# Patient Record
Sex: FEMALE | Race: WHITE | NOT HISPANIC OR LATINO | URBAN - METROPOLITAN AREA
[De-identification: names, ages, dates, MRNs, and addresses within clinical notes are randomized per-mention and may not be internally consistent; named-entity substitution may affect disease eponyms.]

---

## 2017-04-17 ENCOUNTER — APPOINTMENT (RX ONLY)
Dept: URBAN - METROPOLITAN AREA CLINIC 167 | Facility: CLINIC | Age: 72
Setting detail: DERMATOLOGY
End: 2017-04-17

## 2017-04-17 DIAGNOSIS — D22 MELANOCYTIC NEVI: ICD-10-CM

## 2017-04-17 DIAGNOSIS — Z71.89 OTHER SPECIFIED COUNSELING: ICD-10-CM

## 2017-04-17 DIAGNOSIS — L82.1 OTHER SEBORRHEIC KERATOSIS: ICD-10-CM

## 2017-04-17 DIAGNOSIS — Z85.828 PERSONAL HISTORY OF OTHER MALIGNANT NEOPLASM OF SKIN: ICD-10-CM

## 2017-04-17 PROBLEM — D22.5 MELANOCYTIC NEVI OF TRUNK: Status: ACTIVE | Noted: 2017-04-17

## 2017-04-17 PROCEDURE — ? COUNSELING

## 2017-04-17 PROCEDURE — 99213 OFFICE O/P EST LOW 20 MIN: CPT

## 2017-04-17 ASSESSMENT — LOCATION SIMPLE DESCRIPTION DERM
LOCATION SIMPLE: RIGHT CALF
LOCATION SIMPLE: LEFT EAR
LOCATION SIMPLE: LEFT ANTERIOR NECK
LOCATION SIMPLE: UPPER BACK

## 2017-04-17 ASSESSMENT — LOCATION DETAILED DESCRIPTION DERM
LOCATION DETAILED: LEFT SUPERIOR LATERAL NECK
LOCATION DETAILED: LEFT ANTERIOR EARLOBE
LOCATION DETAILED: RIGHT DISTAL CALF
LOCATION DETAILED: SUPERIOR THORACIC SPINE

## 2017-04-17 ASSESSMENT — LOCATION ZONE DERM
LOCATION ZONE: EAR
LOCATION ZONE: NECK
LOCATION ZONE: LEG
LOCATION ZONE: TRUNK

## 2017-10-16 ENCOUNTER — APPOINTMENT (RX ONLY)
Dept: URBAN - METROPOLITAN AREA CLINIC 167 | Facility: CLINIC | Age: 72
Setting detail: DERMATOLOGY
End: 2017-10-16

## 2017-10-16 DIAGNOSIS — Z71.89 OTHER SPECIFIED COUNSELING: ICD-10-CM

## 2017-10-16 DIAGNOSIS — D22 MELANOCYTIC NEVI: ICD-10-CM

## 2017-10-16 DIAGNOSIS — L82.1 OTHER SEBORRHEIC KERATOSIS: ICD-10-CM

## 2017-10-16 DIAGNOSIS — Z85.828 PERSONAL HISTORY OF OTHER MALIGNANT NEOPLASM OF SKIN: ICD-10-CM

## 2017-10-16 DIAGNOSIS — L57.0 ACTINIC KERATOSIS: ICD-10-CM

## 2017-10-16 PROBLEM — D22.5 MELANOCYTIC NEVI OF TRUNK: Status: ACTIVE | Noted: 2017-10-16

## 2017-10-16 PROBLEM — C44.41 BASAL CELL CARCINOMA OF SKIN OF SCALP AND NECK: Status: ACTIVE | Noted: 2017-10-16

## 2017-10-16 PROCEDURE — ? COUNSELING

## 2017-10-16 PROCEDURE — 17000 DESTRUCT PREMALG LESION: CPT

## 2017-10-16 PROCEDURE — ? LIQUID NITROGEN

## 2017-10-16 PROCEDURE — 11100: CPT | Mod: 59

## 2017-10-16 PROCEDURE — ? BIOPSY BY SHAVE METHOD AND DESTRUCTION

## 2017-10-16 PROCEDURE — 99213 OFFICE O/P EST LOW 20 MIN: CPT | Mod: 25

## 2017-10-16 PROCEDURE — 17271 DSTR MAL LES S/N/H/F/G 0.6-1: CPT | Mod: 59

## 2017-10-16 ASSESSMENT — LOCATION ZONE DERM
LOCATION ZONE: EAR
LOCATION ZONE: TRUNK
LOCATION ZONE: NECK
LOCATION ZONE: LEG
LOCATION ZONE: ARM

## 2017-10-16 ASSESSMENT — LOCATION DETAILED DESCRIPTION DERM
LOCATION DETAILED: RIGHT LATERAL DORSAL WRIST
LOCATION DETAILED: RIGHT DISTAL CALF
LOCATION DETAILED: LEFT ANTERIOR EARLOBE
LOCATION DETAILED: RIGHT DISTAL PRETIBIAL REGION
LOCATION DETAILED: LEFT SUPERIOR LATERAL NECK
LOCATION DETAILED: LEFT SUPERIOR UPPER BACK

## 2017-10-16 ASSESSMENT — LOCATION SIMPLE DESCRIPTION DERM
LOCATION SIMPLE: LEFT EAR
LOCATION SIMPLE: RIGHT CALF
LOCATION SIMPLE: LEFT ANTERIOR NECK
LOCATION SIMPLE: RIGHT PRETIBIAL REGION
LOCATION SIMPLE: LEFT UPPER BACK
LOCATION SIMPLE: RIGHT WRIST

## 2017-10-16 NOTE — PROCEDURE: BIOPSY BY SHAVE METHOD AND DESTRUCTION
Lab Facility: [id number]
Anesthesia Type: 1% lidocaine without epinephrine
Biopsy Type: H and E
Destruction Type: electrodesiccation
Detail Level: Detailed
Anesthesia Volume In Cc: 0.5
Billing Type: Third-Party Bill
Wound Care: Aquaphor
Render Post-Care Instructions In Note?: no
Size Of Lesion After Curettage: 0.6
Hemostasis: Aluminum Chloride
Size Of Lesion In Cm (Optional): 0.4
Bill As?: Biopsy by Shave Method
Notification Instructions: Patient will be notified of biopsy results. However, patient instructed to call the office if not contacted within 2 weeks.
Post-Care Instructions: I reviewed with the patient in detail post-care instructions. Patient is to keep the biopsy site dry overnight, and then apply bacitracin twice daily until healed. Patient may apply hydrogen peroxide soaks to remove any crusting.
Number Of Curettages: 2
Consent: Written consent was obtained and risks were reviewed including but not limited to scarring, infection, bleeding, scabbing, incomplete removal, nerve damage and allergy to anesthesia.
Lab: [id number]
Billing Type: Third-Party Bill
Size Of Lesion After Curettage: 0.8
Lab Facility: [id number]
Lab: [id number]
Bill As?: Malignant Destruction

## 2017-10-16 NOTE — PROCEDURE: LIQUID NITROGEN
Duration Of Freeze Thaw-Cycle (Seconds): 10
Number Of Freeze-Thaw Cycles: 2 freeze-thaw cycles
Post-Care Instructions: I reviewed with the patient in detail post-care instructions. Patient is to wear sunprotection, and avoid picking at any of the treated lesions. Pt may apply Vaseline to crusted or scabbing areas.
Detail Level: Detailed
Consent: The patient's consent was obtained including but not limited to risks of crusting, scabbing, blistering, scarring, darker or lighter pigmentary change, recurrence, incomplete removal and infection.
Render Post-Care Instructions In Note?: no

## 2018-04-16 ENCOUNTER — APPOINTMENT (RX ONLY)
Dept: URBAN - METROPOLITAN AREA CLINIC 167 | Facility: CLINIC | Age: 73
Setting detail: DERMATOLOGY
End: 2018-04-16

## 2018-04-16 DIAGNOSIS — Z71.89 OTHER SPECIFIED COUNSELING: ICD-10-CM

## 2018-04-16 DIAGNOSIS — Z85.828 PERSONAL HISTORY OF OTHER MALIGNANT NEOPLASM OF SKIN: ICD-10-CM

## 2018-04-16 DIAGNOSIS — D22 MELANOCYTIC NEVI: ICD-10-CM

## 2018-04-16 DIAGNOSIS — L82.1 OTHER SEBORRHEIC KERATOSIS: ICD-10-CM

## 2018-04-16 PROBLEM — D22.5 MELANOCYTIC NEVI OF TRUNK: Status: ACTIVE | Noted: 2018-04-16

## 2018-04-16 PROBLEM — C44.41 BASAL CELL CARCINOMA OF SKIN OF SCALP AND NECK: Status: ACTIVE | Noted: 2018-04-16

## 2018-04-16 PROCEDURE — ? COUNSELING

## 2018-04-16 PROCEDURE — 99213 OFFICE O/P EST LOW 20 MIN: CPT | Mod: 25

## 2018-04-16 PROCEDURE — 17271 DSTR MAL LES S/N/H/F/G 0.6-1: CPT

## 2018-04-16 PROCEDURE — ? CURETTAGE AND DESTRUCTION

## 2018-04-16 ASSESSMENT — LOCATION ZONE DERM
LOCATION ZONE: TRUNK
LOCATION ZONE: EAR
LOCATION ZONE: LEG

## 2018-04-16 ASSESSMENT — LOCATION DETAILED DESCRIPTION DERM
LOCATION DETAILED: LEFT ANTERIOR EARLOBE
LOCATION DETAILED: INFERIOR THORACIC SPINE
LOCATION DETAILED: RIGHT DISTAL CALF

## 2018-04-16 ASSESSMENT — LOCATION SIMPLE DESCRIPTION DERM
LOCATION SIMPLE: UPPER BACK
LOCATION SIMPLE: LEFT EAR
LOCATION SIMPLE: RIGHT CALF

## 2018-04-16 NOTE — PROCEDURE: CURETTAGE AND DESTRUCTION
Size Of Lesion After Curettage: 1
Anesthesia Type: 1% lidocaine with epinephrine
Number Of Curettages: 3
Post-Care Instructions: I reviewed with the patient in detail post-care instructions. Patient is to keep the area dry for 48 hours, and not to engage in any swimming until the area is healed. Should the patient develop any fevers, chills, bleeding, severe pain patient will contact the office immediately.
Size Of Lesion In Cm: 0.9
Add Intralesional Injection: No
Detail Level: Detailed
Consent was obtained from the patient. The risks, benefits and alternatives to therapy were discussed in detail. Specifically, the risks of infection, scarring, bleeding, prolonged wound healing, nerve injury, incomplete removal, allergy to anesthesia and recurrence were addressed. Alternatives to ED&C, such as: surgical removal and XRT were also discussed.  Prior to the procedure, the treatment site was clearly identified and confirmed by the patient. All components of Universal Protocol/PAUSE Rule completed.
Cautery Type: electrodesiccation
Additional Information: (Optional): The wound was cleaned, and a pressure dressing was applied.  The patient received detailed post-op instructions.
Anesthesia Volume In Cc: 0.3
Bill As A Line Item Or As Units: Line Item
What Was Performed First?: Curettage

## 2018-10-15 ENCOUNTER — APPOINTMENT (RX ONLY)
Dept: URBAN - METROPOLITAN AREA CLINIC 167 | Facility: CLINIC | Age: 73
Setting detail: DERMATOLOGY
End: 2018-10-15

## 2018-10-15 DIAGNOSIS — D22 MELANOCYTIC NEVI: ICD-10-CM

## 2018-10-15 DIAGNOSIS — Z85.828 PERSONAL HISTORY OF OTHER MALIGNANT NEOPLASM OF SKIN: ICD-10-CM

## 2018-10-15 DIAGNOSIS — L57.0 ACTINIC KERATOSIS: ICD-10-CM

## 2018-10-15 DIAGNOSIS — Z71.89 OTHER SPECIFIED COUNSELING: ICD-10-CM

## 2018-10-15 DIAGNOSIS — L82.1 OTHER SEBORRHEIC KERATOSIS: ICD-10-CM

## 2018-10-15 PROBLEM — D22.5 MELANOCYTIC NEVI OF TRUNK: Status: ACTIVE | Noted: 2018-10-15

## 2018-10-15 PROBLEM — C44.319 BASAL CELL CARCINOMA OF SKIN OF OTHER PARTS OF FACE: Status: ACTIVE | Noted: 2018-10-15

## 2018-10-15 PROCEDURE — ? BIOPSY BY SHAVE METHOD AND DESTRUCTION

## 2018-10-15 PROCEDURE — 99213 OFFICE O/P EST LOW 20 MIN: CPT | Mod: 25

## 2018-10-15 PROCEDURE — ? COUNSELING

## 2018-10-15 PROCEDURE — 17000 DESTRUCT PREMALG LESION: CPT

## 2018-10-15 PROCEDURE — ? LIQUID NITROGEN

## 2018-10-15 PROCEDURE — 17281 DSTR MAL LS F/E/E/N/L/M .6-1: CPT | Mod: 59

## 2018-10-15 ASSESSMENT — LOCATION ZONE DERM
LOCATION ZONE: TRUNK
LOCATION ZONE: EAR
LOCATION ZONE: LEG
LOCATION ZONE: FACE

## 2018-10-15 ASSESSMENT — LOCATION DETAILED DESCRIPTION DERM
LOCATION DETAILED: LEFT ANTERIOR EARLOBE
LOCATION DETAILED: LEFT SUPERIOR CENTRAL BUCCAL CHEEK
LOCATION DETAILED: INFERIOR THORACIC SPINE
LOCATION DETAILED: RIGHT DISTAL CALF

## 2018-10-15 ASSESSMENT — LOCATION SIMPLE DESCRIPTION DERM
LOCATION SIMPLE: LEFT EAR
LOCATION SIMPLE: RIGHT CALF
LOCATION SIMPLE: UPPER BACK
LOCATION SIMPLE: LEFT CHEEK

## 2018-10-15 NOTE — PROCEDURE: LIQUID NITROGEN
Render Post-Care Instructions In Note?: no
Consent: The patient's consent was obtained including but not limited to risks of crusting, scabbing, blistering, scarring, darker or lighter pigmentary change, recurrence, incomplete removal and infection.
Number Of Freeze-Thaw Cycles: 2 freeze-thaw cycles
Detail Level: Detailed
Post-Care Instructions: I reviewed with the patient in detail post-care instructions. Patient is to wear sunprotection, and avoid picking at any of the treated lesions. Pt may apply Vaseline to crusted or scabbing areas.
Duration Of Freeze Thaw-Cycle (Seconds): 10

## 2018-10-15 NOTE — PROCEDURE: BIOPSY BY SHAVE METHOD AND DESTRUCTION
Anesthesia Type: 1% lidocaine with epinephrine 1:100,000 buffered with 8.4% sodium bicarbonate (1:9 ratio)
Bill As?: Malignant Destruction
Detail Level: Detailed
Biopsy Type: H and E
Anesthesia Volume In Cc: 0.5
Lab Facility: [id number]
Consent: Written consent was obtained and risks were reviewed including but not limited to scarring, infection, bleeding, scabbing, incomplete removal, nerve damage and allergy to anesthesia.
Billing Type: Third-Party Bill
Destruction Type: electrodesiccation
Bill 10409 For Specimen Handling/Conveyance To Laboratory?: no
Wound Care: Aquaphor
Size Of Lesion In Cm (Optional): 0.6
Post-Care Instructions: I reviewed with the patient in detail post-care instructions. Patient is to keep the biopsy site dry overnight, and then apply bacitracin twice daily until healed. Patient may apply hydrogen peroxide soaks to remove any crusting.
Notification Instructions: Patient will be notified of biopsy results. However, patient instructed to call the office if not contacted within 2 weeks.
Size Of Lesion After Curettage: 0.8
Number Of Curettages: 2
Lab: [id number]
Hemostasis: Aluminum Chloride

## 2019-04-15 ENCOUNTER — APPOINTMENT (RX ONLY)
Dept: URBAN - METROPOLITAN AREA CLINIC 167 | Facility: CLINIC | Age: 74
Setting detail: DERMATOLOGY
End: 2019-04-15

## 2019-04-15 DIAGNOSIS — L57.0 ACTINIC KERATOSIS: ICD-10-CM

## 2019-04-15 DIAGNOSIS — Z71.89 OTHER SPECIFIED COUNSELING: ICD-10-CM

## 2019-04-15 DIAGNOSIS — L82.1 OTHER SEBORRHEIC KERATOSIS: ICD-10-CM

## 2019-04-15 DIAGNOSIS — Z85.828 PERSONAL HISTORY OF OTHER MALIGNANT NEOPLASM OF SKIN: ICD-10-CM

## 2019-04-15 DIAGNOSIS — D22 MELANOCYTIC NEVI: ICD-10-CM

## 2019-04-15 PROBLEM — D48.5 NEOPLASM OF UNCERTAIN BEHAVIOR OF SKIN: Status: ACTIVE | Noted: 2019-04-15

## 2019-04-15 PROBLEM — C44.719 BASAL CELL CARCINOMA OF SKIN OF LEFT LOWER LIMB, INCLUDING HIP: Status: ACTIVE | Noted: 2019-04-15

## 2019-04-15 PROBLEM — D22.5 MELANOCYTIC NEVI OF TRUNK: Status: ACTIVE | Noted: 2019-04-15

## 2019-04-15 PROCEDURE — 17003 DESTRUCT PREMALG LES 2-14: CPT

## 2019-04-15 PROCEDURE — 17000 DESTRUCT PREMALG LESION: CPT | Mod: 59

## 2019-04-15 PROCEDURE — 11102 TANGNTL BX SKIN SINGLE LES: CPT

## 2019-04-15 PROCEDURE — ? LIQUID NITROGEN

## 2019-04-15 PROCEDURE — ? BIOPSY BY SHAVE METHOD AND DESTRUCTION

## 2019-04-15 PROCEDURE — 99213 OFFICE O/P EST LOW 20 MIN: CPT | Mod: 25

## 2019-04-15 PROCEDURE — ? COUNSELING

## 2019-04-15 PROCEDURE — 17260 DSTRJ MAL LES T/A/L 0.5 CM/<: CPT | Mod: 59

## 2019-04-15 ASSESSMENT — LOCATION SIMPLE DESCRIPTION DERM
LOCATION SIMPLE: LEFT CHEEK
LOCATION SIMPLE: NOSE
LOCATION SIMPLE: LEFT EAR
LOCATION SIMPLE: UPPER BACK
LOCATION SIMPLE: RIGHT CALF
LOCATION SIMPLE: RIGHT PRETIBIAL REGION

## 2019-04-15 ASSESSMENT — LOCATION DETAILED DESCRIPTION DERM
LOCATION DETAILED: RIGHT DISTAL CALF
LOCATION DETAILED: LEFT SUPERIOR LATERAL BUCCAL CHEEK
LOCATION DETAILED: LEFT ANTERIOR EARLOBE
LOCATION DETAILED: INFERIOR THORACIC SPINE
LOCATION DETAILED: LEFT NASAL DORSUM
LOCATION DETAILED: RIGHT DISTAL PRETIBIAL REGION
LOCATION DETAILED: RIGHT NASAL DORSUM

## 2019-04-15 ASSESSMENT — LOCATION ZONE DERM
LOCATION ZONE: FACE
LOCATION ZONE: LEG
LOCATION ZONE: NOSE
LOCATION ZONE: EAR
LOCATION ZONE: TRUNK

## 2019-04-15 NOTE — HPI: NON-MELANOMA SKIN CANCER F/U (HISTORY OF NMSC)
How Many Skin Cancers Have You Had?: more than one
What Is The Reason For Today's Visit?: Follow Up Non-Melanoma Skin Cancer
When Was Your Last Cancer Diagnosed?: 10/18

## 2019-04-15 NOTE — PROCEDURE: BIOPSY BY SHAVE METHOD AND DESTRUCTION
Render Post-Care Instructions In Note?: no
Lab Facility: [id number]
Size Of Lesion After Curettage: 0.8
Number Of Curettages: 2
Notification Instructions: Patient will be notified of biopsy results. However, patient instructed to call the office if not contacted within 2 weeks.
Anesthesia Volume In Cc: 0.5
Anesthesia Type: 1% lidocaine with epinephrine 1:100,000 buffered with 8.4% sodium bicarbonate (1:9 ratio)
Lab: [id number]
Detail Level: Detailed
Bill As?: Biopsy by Shave Method
Biopsy Type: H and E
Billing Type: Third-Party Bill
Destruction Type: electrodesiccation
Hemostasis: Aluminum Chloride
Consent: Written consent was obtained and risks were reviewed including but not limited to scarring, infection, bleeding, scabbing, incomplete removal, nerve damage and allergy to anesthesia.
Size Of Lesion In Cm (Optional): 0.6
Wound Care: Aquaphor
Post-Care Instructions: I reviewed with the patient in detail post-care instructions. Patient is to keep the biopsy site dry overnight, and then apply bacitracin twice daily until healed. Patient may apply hydrogen peroxide soaks to remove any crusting.
Billing Type: Third-Party Bill
Lab: [id number]
Size Of Lesion In Cm (Optional): 0.3
Lab Facility: [id number]
Bill As?: Malignant Destruction

## 2019-10-23 ENCOUNTER — APPOINTMENT (RX ONLY)
Dept: URBAN - METROPOLITAN AREA CLINIC 167 | Facility: CLINIC | Age: 74
Setting detail: DERMATOLOGY
End: 2019-10-23

## 2019-10-23 DIAGNOSIS — Z85.828 PERSONAL HISTORY OF OTHER MALIGNANT NEOPLASM OF SKIN: ICD-10-CM

## 2019-10-23 DIAGNOSIS — D22 MELANOCYTIC NEVI: ICD-10-CM

## 2019-10-23 DIAGNOSIS — Z71.89 OTHER SPECIFIED COUNSELING: ICD-10-CM

## 2019-10-23 DIAGNOSIS — L82.1 OTHER SEBORRHEIC KERATOSIS: ICD-10-CM

## 2019-10-23 PROBLEM — D22.5 MELANOCYTIC NEVI OF TRUNK: Status: ACTIVE | Noted: 2019-10-23

## 2019-10-23 PROCEDURE — 99213 OFFICE O/P EST LOW 20 MIN: CPT

## 2019-10-23 PROCEDURE — ? COUNSELING

## 2019-10-23 ASSESSMENT — LOCATION DETAILED DESCRIPTION DERM
LOCATION DETAILED: RIGHT MEDIAL UPPER BACK
LOCATION DETAILED: LEFT SUPERIOR LATERAL BUCCAL CHEEK
LOCATION DETAILED: LEFT ANTERIOR EARLOBE
LOCATION DETAILED: RIGHT DISTAL CALF
LOCATION DETAILED: RIGHT DISTAL PRETIBIAL REGION

## 2019-10-23 ASSESSMENT — LOCATION SIMPLE DESCRIPTION DERM
LOCATION SIMPLE: RIGHT CALF
LOCATION SIMPLE: RIGHT UPPER BACK
LOCATION SIMPLE: LEFT CHEEK
LOCATION SIMPLE: LEFT EAR
LOCATION SIMPLE: RIGHT PRETIBIAL REGION

## 2019-10-23 ASSESSMENT — LOCATION ZONE DERM
LOCATION ZONE: FACE
LOCATION ZONE: EAR
LOCATION ZONE: LEG
LOCATION ZONE: TRUNK

## 2019-10-23 NOTE — PROCEDURE: MIPS QUALITY
Detail Level: Detailed
Quality 111:Pneumonia Vaccination Status For Older Adults: Pneumococcal Vaccination Previously Received
Quality 474: Zoster Vaccination Status: Shingrix Vaccination Administered or Previously Received
Quality 110: Preventive Care And Screening: Influenza Immunization: Influenza immunization was not ordered or administered, reason not given

## 2020-06-01 ENCOUNTER — APPOINTMENT (RX ONLY)
Dept: URBAN - METROPOLITAN AREA CLINIC 167 | Facility: CLINIC | Age: 75
Setting detail: DERMATOLOGY
End: 2020-06-01

## 2020-06-01 DIAGNOSIS — Z71.89 OTHER SPECIFIED COUNSELING: ICD-10-CM

## 2020-06-01 DIAGNOSIS — L57.0 ACTINIC KERATOSIS: ICD-10-CM

## 2020-06-01 DIAGNOSIS — Z85.828 PERSONAL HISTORY OF OTHER MALIGNANT NEOPLASM OF SKIN: ICD-10-CM

## 2020-06-01 DIAGNOSIS — D22 MELANOCYTIC NEVI: ICD-10-CM

## 2020-06-01 DIAGNOSIS — L82.1 OTHER SEBORRHEIC KERATOSIS: ICD-10-CM

## 2020-06-01 PROBLEM — C44.319 BASAL CELL CARCINOMA OF SKIN OF OTHER PARTS OF FACE: Status: ACTIVE | Noted: 2020-06-01

## 2020-06-01 PROBLEM — D22.5 MELANOCYTIC NEVI OF TRUNK: Status: ACTIVE | Noted: 2020-06-01

## 2020-06-01 PROCEDURE — 17003 DESTRUCT PREMALG LES 2-14: CPT

## 2020-06-01 PROCEDURE — ? CURETTAGE AND DESTRUCTION

## 2020-06-01 PROCEDURE — ? LIQUID NITROGEN

## 2020-06-01 PROCEDURE — ? COUNSELING

## 2020-06-01 PROCEDURE — 17281 DSTR MAL LS F/E/E/N/L/M .6-1: CPT

## 2020-06-01 PROCEDURE — 99213 OFFICE O/P EST LOW 20 MIN: CPT | Mod: 25

## 2020-06-01 PROCEDURE — 17000 DESTRUCT PREMALG LESION: CPT | Mod: 59

## 2020-06-01 ASSESSMENT — LOCATION DETAILED DESCRIPTION DERM
LOCATION DETAILED: LEFT ANTERIOR EARLOBE
LOCATION DETAILED: RIGHT INFERIOR LATERAL FOREHEAD
LOCATION DETAILED: LEFT DISTAL PRETIBIAL REGION
LOCATION DETAILED: RIGHT PROXIMAL DORSAL FOREARM
LOCATION DETAILED: LEFT CENTRAL LATERAL NECK
LOCATION DETAILED: RIGHT MID-UPPER BACK

## 2020-06-01 ASSESSMENT — LOCATION SIMPLE DESCRIPTION DERM
LOCATION SIMPLE: NECK
LOCATION SIMPLE: LEFT EAR
LOCATION SIMPLE: LEFT PRETIBIAL REGION
LOCATION SIMPLE: RIGHT UPPER BACK
LOCATION SIMPLE: RIGHT FOREARM
LOCATION SIMPLE: RIGHT FOREHEAD

## 2020-06-01 ASSESSMENT — LOCATION ZONE DERM
LOCATION ZONE: NECK
LOCATION ZONE: ARM
LOCATION ZONE: LEG
LOCATION ZONE: EAR
LOCATION ZONE: FACE
LOCATION ZONE: TRUNK

## 2020-06-01 NOTE — PROCEDURE: CURETTAGE AND DESTRUCTION
Detail Level: Detailed
Size Of Lesion In Cm: 0.6
Size Of Lesion After Curettage: 0.8
Add Intralesional Injection: No
Total Volume (Ccs): 1
Anesthesia Type: 1% lidocaine with epinephrine 1:100,000 buffered with 8.4% sodium bicarbonate (1:9 ratio)
Anesthesia Volume In Cc: 0.5
Cautery Type: electrodesiccation
Number Of Curettages: 3
What Was Performed First?: Curettage
Additional Information: (Optional): The wound was cleaned, and a pressure dressing was applied.  The patient received detailed post-op instructions.
Consent was obtained from the patient. The risks, benefits and alternatives to therapy were discussed in detail. Specifically, the risks of infection, scarring, bleeding, prolonged wound healing, nerve injury, incomplete removal, allergy to anesthesia and recurrence were addressed. Alternatives to ED&C, such as: surgical removal and XRT were also discussed.  Prior to the procedure, the treatment site was clearly identified and confirmed by the patient. All components of Universal Protocol/PAUSE Rule completed.
Post-Care Instructions: I reviewed with the patient in detail post-care instructions. Patient is to keep the area dry for 48 hours, and not to engage in any swimming until the area is healed. Should the patient develop any fevers, chills, bleeding, severe pain patient will contact the office immediately.
Bill As A Line Item Or As Units: Line Item

## 2020-11-30 ENCOUNTER — APPOINTMENT (RX ONLY)
Dept: URBAN - METROPOLITAN AREA CLINIC 167 | Facility: CLINIC | Age: 75
Setting detail: DERMATOLOGY
End: 2020-11-30

## 2020-11-30 DIAGNOSIS — Z71.89 OTHER SPECIFIED COUNSELING: ICD-10-CM

## 2020-11-30 DIAGNOSIS — D22 MELANOCYTIC NEVI: ICD-10-CM

## 2020-11-30 DIAGNOSIS — L82.1 OTHER SEBORRHEIC KERATOSIS: ICD-10-CM

## 2020-11-30 DIAGNOSIS — Z85.828 PERSONAL HISTORY OF OTHER MALIGNANT NEOPLASM OF SKIN: ICD-10-CM

## 2020-11-30 PROBLEM — D22.5 MELANOCYTIC NEVI OF TRUNK: Status: ACTIVE | Noted: 2020-11-30

## 2020-11-30 PROCEDURE — 99213 OFFICE O/P EST LOW 20 MIN: CPT

## 2020-11-30 PROCEDURE — ? COUNSELING

## 2020-11-30 ASSESSMENT — LOCATION SIMPLE DESCRIPTION DERM
LOCATION SIMPLE: RIGHT PRETIBIAL REGION
LOCATION SIMPLE: LEFT CHEEK
LOCATION SIMPLE: RIGHT UPPER BACK

## 2020-11-30 ASSESSMENT — LOCATION ZONE DERM
LOCATION ZONE: TRUNK
LOCATION ZONE: FACE
LOCATION ZONE: LEG

## 2020-11-30 ASSESSMENT — LOCATION DETAILED DESCRIPTION DERM
LOCATION DETAILED: RIGHT MEDIAL UPPER BACK
LOCATION DETAILED: RIGHT PROXIMAL PRETIBIAL REGION
LOCATION DETAILED: LEFT INFERIOR CENTRAL MALAR CHEEK

## 2021-06-02 ENCOUNTER — APPOINTMENT (RX ONLY)
Dept: URBAN - METROPOLITAN AREA CLINIC 167 | Facility: CLINIC | Age: 76
Setting detail: DERMATOLOGY
End: 2021-06-02

## 2021-06-02 DIAGNOSIS — D22 MELANOCYTIC NEVI: ICD-10-CM

## 2021-06-02 DIAGNOSIS — Z85.828 PERSONAL HISTORY OF OTHER MALIGNANT NEOPLASM OF SKIN: ICD-10-CM

## 2021-06-02 DIAGNOSIS — L82.0 INFLAMED SEBORRHEIC KERATOSIS: ICD-10-CM

## 2021-06-02 DIAGNOSIS — Z71.89 OTHER SPECIFIED COUNSELING: ICD-10-CM

## 2021-06-02 DIAGNOSIS — L82.1 OTHER SEBORRHEIC KERATOSIS: ICD-10-CM

## 2021-06-02 PROBLEM — D22.5 MELANOCYTIC NEVI OF TRUNK: Status: ACTIVE | Noted: 2021-06-02

## 2021-06-02 PROCEDURE — 17110 DESTRUCTION B9 LES UP TO 14: CPT

## 2021-06-02 PROCEDURE — ? LIQUID NITROGEN

## 2021-06-02 PROCEDURE — ? COUNSELING

## 2021-06-02 PROCEDURE — 99213 OFFICE O/P EST LOW 20 MIN: CPT | Mod: 25

## 2021-06-02 ASSESSMENT — LOCATION DETAILED DESCRIPTION DERM
LOCATION DETAILED: RIGHT LATERAL BREAST 6-7:00 REGION
LOCATION DETAILED: RIGHT MEDIAL UPPER BACK
LOCATION DETAILED: LEFT INFERIOR CENTRAL MALAR CHEEK
LOCATION DETAILED: LEFT ANTERIOR SHOULDER
LOCATION DETAILED: RIGHT PROXIMAL PRETIBIAL REGION

## 2021-06-02 ASSESSMENT — LOCATION ZONE DERM
LOCATION ZONE: ARM
LOCATION ZONE: TRUNK
LOCATION ZONE: FACE
LOCATION ZONE: LEG

## 2021-06-02 ASSESSMENT — LOCATION SIMPLE DESCRIPTION DERM
LOCATION SIMPLE: LEFT SHOULDER
LOCATION SIMPLE: RIGHT BREAST
LOCATION SIMPLE: LEFT CHEEK
LOCATION SIMPLE: RIGHT PRETIBIAL REGION
LOCATION SIMPLE: RIGHT UPPER BACK

## 2021-06-02 NOTE — PROCEDURE: LIQUID NITROGEN
Medical Necessity Information: It is in your best interest to select a reason for this procedure from the list below. All of these items fulfill various CMS LCD requirements except the new and changing color options.
Consent: The patient's consent was obtained including but not limited to risks of crusting, scabbing, blistering, scarring, darker or lighter pigmentary change, recurrence, incomplete removal and infection.
Medical Necessity Clause: This procedure was medically necessary because the lesions that were treated were:
Number Of Freeze-Thaw Cycles: 2 freeze-thaw cycles
Post-Care Instructions: I reviewed with the patient in detail post-care instructions. Patient is to wear sunprotection, and avoid picking at any of the treated lesions. Pt may apply Vaseline to crusted or scabbing areas.
Render Post-Care Instructions In Note?: no
Include Z78.9 (Other Specified Conditions Influencing Health Status) As An Associated Diagnosis?: Yes
Detail Level: Simple

## 2021-12-01 ENCOUNTER — APPOINTMENT (RX ONLY)
Dept: URBAN - METROPOLITAN AREA CLINIC 167 | Facility: CLINIC | Age: 76
Setting detail: DERMATOLOGY
End: 2021-12-01

## 2021-12-01 DIAGNOSIS — Z85.828 PERSONAL HISTORY OF OTHER MALIGNANT NEOPLASM OF SKIN: ICD-10-CM

## 2021-12-01 DIAGNOSIS — L57.0 ACTINIC KERATOSIS: ICD-10-CM

## 2021-12-01 DIAGNOSIS — L82.1 OTHER SEBORRHEIC KERATOSIS: ICD-10-CM

## 2021-12-01 DIAGNOSIS — D22 MELANOCYTIC NEVI: ICD-10-CM

## 2021-12-01 DIAGNOSIS — Z71.89 OTHER SPECIFIED COUNSELING: ICD-10-CM

## 2021-12-01 PROBLEM — D22.5 MELANOCYTIC NEVI OF TRUNK: Status: ACTIVE | Noted: 2021-12-01

## 2021-12-01 PROCEDURE — 17003 DESTRUCT PREMALG LES 2-14: CPT

## 2021-12-01 PROCEDURE — ? LIQUID NITROGEN

## 2021-12-01 PROCEDURE — ? COUNSELING

## 2021-12-01 PROCEDURE — 99213 OFFICE O/P EST LOW 20 MIN: CPT | Mod: 25

## 2021-12-01 PROCEDURE — 17000 DESTRUCT PREMALG LESION: CPT

## 2021-12-01 ASSESSMENT — LOCATION SIMPLE DESCRIPTION DERM
LOCATION SIMPLE: LEFT PRETIBIAL REGION
LOCATION SIMPLE: LEFT UPPER ARM
LOCATION SIMPLE: LEFT CHEEK
LOCATION SIMPLE: RIGHT UPPER BACK
LOCATION SIMPLE: LEFT ZYGOMA

## 2021-12-01 ASSESSMENT — LOCATION DETAILED DESCRIPTION DERM
LOCATION DETAILED: LEFT CENTRAL ZYGOMA
LOCATION DETAILED: LEFT INFERIOR CENTRAL MALAR CHEEK
LOCATION DETAILED: RIGHT MEDIAL UPPER BACK
LOCATION DETAILED: LEFT DISTAL PRETIBIAL REGION
LOCATION DETAILED: LEFT ANTERIOR LATERAL PROXIMAL UPPER ARM
LOCATION DETAILED: LEFT LATERAL DISTAL PRETIBIAL REGION

## 2021-12-01 ASSESSMENT — LOCATION ZONE DERM
LOCATION ZONE: LEG
LOCATION ZONE: ARM
LOCATION ZONE: FACE
LOCATION ZONE: TRUNK

## 2021-12-01 NOTE — PROCEDURE: LIQUID NITROGEN
Consent: The patient's consent was obtained including but not limited to risks of crusting, scabbing, blistering, scarring, darker or lighter pigmentary change, recurrence, incomplete removal and infection.
Show Aperture Variable?: Yes
Render Post-Care Instructions In Note?: no
Number Of Freeze-Thaw Cycles: 2 freeze-thaw cycles
Duration Of Freeze Thaw-Cycle (Seconds): 10
Post-Care Instructions: I reviewed with the patient in detail post-care instructions. Patient is to wear sunprotection, and avoid picking at any of the treated lesions. Pt may apply Vaseline to crusted or scabbing areas.
Detail Level: Simple

## 2022-06-01 ENCOUNTER — APPOINTMENT (RX ONLY)
Dept: URBAN - METROPOLITAN AREA CLINIC 167 | Facility: CLINIC | Age: 77
Setting detail: DERMATOLOGY
End: 2022-06-01

## 2022-06-01 DIAGNOSIS — Z71.89 OTHER SPECIFIED COUNSELING: ICD-10-CM

## 2022-06-01 DIAGNOSIS — L82.1 OTHER SEBORRHEIC KERATOSIS: ICD-10-CM

## 2022-06-01 DIAGNOSIS — D22 MELANOCYTIC NEVI: ICD-10-CM

## 2022-06-01 DIAGNOSIS — Z85.828 PERSONAL HISTORY OF OTHER MALIGNANT NEOPLASM OF SKIN: ICD-10-CM

## 2022-06-01 DIAGNOSIS — L57.0 ACTINIC KERATOSIS: ICD-10-CM

## 2022-06-01 PROBLEM — D48.5 NEOPLASM OF UNCERTAIN BEHAVIOR OF SKIN: Status: ACTIVE | Noted: 2022-06-01

## 2022-06-01 PROBLEM — D22.5 MELANOCYTIC NEVI OF TRUNK: Status: ACTIVE | Noted: 2022-06-01

## 2022-06-01 PROCEDURE — 17271 DSTR MAL LES S/N/H/F/G 0.6-1: CPT

## 2022-06-01 PROCEDURE — 17000 DESTRUCT PREMALG LESION: CPT | Mod: 59

## 2022-06-01 PROCEDURE — ? LIQUID NITROGEN

## 2022-06-01 PROCEDURE — 99213 OFFICE O/P EST LOW 20 MIN: CPT | Mod: 25

## 2022-06-01 PROCEDURE — ? BIOPSY BY SHAVE METHOD AND DESTRUCTION

## 2022-06-01 PROCEDURE — ? COUNSELING

## 2022-06-01 PROCEDURE — 17003 DESTRUCT PREMALG LES 2-14: CPT

## 2022-06-01 PROCEDURE — ? PATIENT SPECIFIC COUNSELING

## 2022-06-01 PROCEDURE — 17280 DSTR MAL LS F/E/E/N/L/M .5/<: CPT

## 2022-06-01 PROCEDURE — 11102 TANGNTL BX SKIN SINGLE LES: CPT | Mod: 59

## 2022-06-01 PROCEDURE — 17261 DSTRJ MAL LES T/A/L .6-1.0CM: CPT

## 2022-06-01 ASSESSMENT — LOCATION SIMPLE DESCRIPTION DERM
LOCATION SIMPLE: LEFT NOSE
LOCATION SIMPLE: LEFT ZYGOMA
LOCATION SIMPLE: LEFT CHEEK
LOCATION SIMPLE: UPPER BACK
LOCATION SIMPLE: NOSE
LOCATION SIMPLE: RIGHT PRETIBIAL REGION

## 2022-06-01 ASSESSMENT — LOCATION DETAILED DESCRIPTION DERM
LOCATION DETAILED: LEFT NASAL SIDEWALL
LOCATION DETAILED: LEFT NASAL DORSUM
LOCATION DETAILED: LEFT INFERIOR CENTRAL MALAR CHEEK
LOCATION DETAILED: INFERIOR THORACIC SPINE
LOCATION DETAILED: RIGHT PROXIMAL PRETIBIAL REGION
LOCATION DETAILED: LEFT MEDIAL ZYGOMA
LOCATION DETAILED: RIGHT NASAL DORSUM
LOCATION DETAILED: LEFT CENTRAL ZYGOMA

## 2022-06-01 ASSESSMENT — LOCATION ZONE DERM
LOCATION ZONE: FACE
LOCATION ZONE: NOSE
LOCATION ZONE: TRUNK
LOCATION ZONE: LEG

## 2022-06-01 NOTE — PROCEDURE: LIQUID NITROGEN
Post-Care Instructions: I reviewed with the patient in detail post-care instructions. Patient is to wear sunprotection, and avoid picking at any of the treated lesions. Pt may apply Vaseline to crusted or scabbing areas.
Duration Of Freeze Thaw-Cycle (Seconds): 10
Render Note In Bullet Format When Appropriate: No
Consent: The patient's consent was obtained including but not limited to risks of crusting, scabbing, blistering, scarring, darker or lighter pigmentary change, recurrence, incomplete removal and infection.
Show Applicator Variable?: Yes
Number Of Freeze-Thaw Cycles: 2 freeze-thaw cycles
Detail Level: Simple

## 2022-06-01 NOTE — PROCEDURE: BIOPSY BY SHAVE METHOD AND DESTRUCTION
Detail Level: Detailed
Biopsy Type: H and E
Bill As?: Biopsy by Shave Method
Size Of Lesion In Cm (Optional): 0.5
Size Of Lesion After Curettage: 0.7
Anesthesia Type: 1% lidocaine with epinephrine 1:100,000 buffered with 8.4% sodium bicarbonate (1:9 ratio)
Hemostasis: Aluminum Chloride
Destruction Type: electrodesiccation
Number Of Curettages: 3
Wound Care: Aquaphor
Lab: [id number]
Lab Facility: [id number]
Render Path Notes In Note?: No
Consent: Written consent was obtained and risks were reviewed including but not limited to scarring, infection, bleeding, scabbing, incomplete removal, nerve damage and allergy to anesthesia.
Post-Care Instructions: I reviewed with the patient in detail post-care instructions. Patient is to keep the biopsy site dry overnight, and then apply bacitracin twice daily until healed. Patient may apply hydrogen peroxide soaks to remove any crusting.
Notification Instructions: Patient will be notified of biopsy results. However, patient instructed to call the office if not contacted within 2 weeks.
Billing Type: Third-Party Bill
Bill As?: Malignant Destruction
Size Of Lesion In Cm (Optional): 0.3
Lab: [id number]
Lab Facility: [id number]
Billing Type: Third-Party Bill
Size Of Lesion In Cm (Optional): 0.6
Size Of Lesion After Curettage: 0.8

## 2022-12-08 ENCOUNTER — APPOINTMENT (RX ONLY)
Dept: URBAN - METROPOLITAN AREA CLINIC 167 | Facility: CLINIC | Age: 77
Setting detail: DERMATOLOGY
End: 2022-12-08

## 2022-12-08 DIAGNOSIS — Z85.828 PERSONAL HISTORY OF OTHER MALIGNANT NEOPLASM OF SKIN: ICD-10-CM

## 2022-12-08 DIAGNOSIS — L82.1 OTHER SEBORRHEIC KERATOSIS: ICD-10-CM

## 2022-12-08 DIAGNOSIS — D22 MELANOCYTIC NEVI: ICD-10-CM

## 2022-12-08 DIAGNOSIS — Z71.89 OTHER SPECIFIED COUNSELING: ICD-10-CM

## 2022-12-08 DIAGNOSIS — L57.0 ACTINIC KERATOSIS: ICD-10-CM

## 2022-12-08 PROBLEM — D22.5 MELANOCYTIC NEVI OF TRUNK: Status: ACTIVE | Noted: 2022-12-08

## 2022-12-08 PROBLEM — D48.5 NEOPLASM OF UNCERTAIN BEHAVIOR OF SKIN: Status: ACTIVE | Noted: 2022-12-08

## 2022-12-08 PROCEDURE — ? BIOPSY BY SHAVE METHOD AND DESTRUCTION

## 2022-12-08 PROCEDURE — 17260 DSTRJ MAL LES T/A/L 0.5 CM/<: CPT | Mod: 59

## 2022-12-08 PROCEDURE — 17000 DESTRUCT PREMALG LESION: CPT | Mod: 59

## 2022-12-08 PROCEDURE — ? BIOPSY BY SHAVE METHOD

## 2022-12-08 PROCEDURE — 11102 TANGNTL BX SKIN SINGLE LES: CPT

## 2022-12-08 PROCEDURE — ? COUNSELING

## 2022-12-08 PROCEDURE — 17003 DESTRUCT PREMALG LES 2-14: CPT

## 2022-12-08 PROCEDURE — 99213 OFFICE O/P EST LOW 20 MIN: CPT | Mod: 25

## 2022-12-08 PROCEDURE — ? LIQUID NITROGEN

## 2022-12-08 ASSESSMENT — LOCATION SIMPLE DESCRIPTION DERM
LOCATION SIMPLE: LEFT CHEEK
LOCATION SIMPLE: CHEST
LOCATION SIMPLE: UPPER BACK
LOCATION SIMPLE: RIGHT CHEEK
LOCATION SIMPLE: POSTERIOR SCALP

## 2022-12-08 ASSESSMENT — LOCATION DETAILED DESCRIPTION DERM
LOCATION DETAILED: STERNAL NOTCH
LOCATION DETAILED: INFERIOR THORACIC SPINE
LOCATION DETAILED: LEFT INFERIOR CENTRAL MALAR CHEEK
LOCATION DETAILED: RIGHT SUPERIOR MEDIAL BUCCAL CHEEK
LOCATION DETAILED: RIGHT CENTRAL BUCCAL CHEEK
LOCATION DETAILED: POSTERIOR MID-PARIETAL SCALP

## 2022-12-08 ASSESSMENT — LOCATION ZONE DERM
LOCATION ZONE: TRUNK
LOCATION ZONE: FACE
LOCATION ZONE: SCALP

## 2022-12-08 NOTE — PROCEDURE: LIQUID NITROGEN
Show Aperture Variable?: Yes
Consent: The patient's consent was obtained including but not limited to risks of crusting, scabbing, blistering, scarring, darker or lighter pigmentary change, recurrence, incomplete removal and infection.
Render Post-Care Instructions In Note?: no
Number Of Freeze-Thaw Cycles: 2 freeze-thaw cycles
Detail Level: Detailed
Post-Care Instructions: I reviewed with the patient in detail post-care instructions. Patient is to wear sunprotection, and avoid picking at any of the treated lesions. Pt may apply Vaseline to crusted or scabbing areas.
Duration Of Freeze Thaw-Cycle (Seconds): 10

## 2022-12-08 NOTE — PROCEDURE: BIOPSY BY SHAVE METHOD AND DESTRUCTION
Detail Level: Detailed
Biopsy Type: H and E
Bill As?: Malignant Destruction
Size Of Lesion In Cm (Optional): 0.3
Size Of Lesion After Curettage: 0.5
Anesthesia Type: 1% lidocaine without epinephrine
Hemostasis: Aluminum Chloride
Destruction Type: electrodesiccation
Number Of Curettages: 2
Wound Care: Aquaphor
Lab: [id number]
Lab Facility: [id number]
Render Path Notes In Note?: No
Consent: Written consent was obtained and risks were reviewed including but not limited to scarring, infection, bleeding, scabbing, incomplete removal, nerve damage and allergy to anesthesia.
Post-Care Instructions: I reviewed with the patient in detail post-care instructions. Patient is to keep the biopsy site dry overnight, and then apply bacitracin twice daily until healed. Patient may apply hydrogen peroxide soaks to remove any crusting.
Notification Instructions: Patient will be notified of biopsy results. However, patient instructed to call the office if not contacted within 2 weeks.
Billing Type: Third-Party Bill

## 2022-12-08 NOTE — PROCEDURE: BIOPSY BY SHAVE METHOD
Detail Level: Detailed
Depth Of Biopsy: dermis
Was A Bandage Applied: Yes
Size Of Lesion In Cm: 2
X Size Of Lesion In Cm: 0
Additional Anticipated Plans: If malignant consider Mohs surgery
Biopsy Type: H and E
Biopsy Method: Personna blade
Anesthesia Type: 1% lidocaine without epinephrine
Anesthesia Volume In Cc (Will Not Render If 0): 0.5
Hemostasis: Aluminum Chloride
Wound Care: Aquaphor
Dressing: bandage
Destruction After The Procedure: No
Type Of Destruction Used: Curettage
Curettage Text: The wound bed was treated with curettage after the biopsy was performed.
Cryotherapy Text: The wound bed was treated with cryotherapy after the biopsy was performed.
Electrodesiccation Text: The wound bed was treated with electrodesiccation after the biopsy was performed.
Electrodesiccation And Curettage Text: The wound bed was treated with electrodesiccation and curettage after the biopsy was performed.
Silver Nitrate Text: The wound bed was treated with silver nitrate after the biopsy was performed.
Lab: [id number]
Lab Facility: [id number]
Consent: Written consent was obtained and risks were reviewed including but not limited to scarring, infection, bleeding, scabbing, incomplete removal, nerve damage and allergy to anesthesia.
Post-Care Instructions: I reviewed with the patient in detail post-care instructions. Patient is to keep the biopsy site dry overnight, and then apply Aquaphor twice daily until healed.
Notification Instructions: Patient will be notified of biopsy results. However, patient instructed to call the office if not contacted within 2 weeks.
Billing Type: Third-Party Bill
Information: Selecting Yes will display possible errors in your note based on the variables you have selected. This validation is only offered as a suggestion for you. PLEASE NOTE THAT THE VALIDATION TEXT WILL BE REMOVED WHEN YOU FINALIZE YOUR NOTE. IF YOU WANT TO FAX A PRELIMINARY NOTE YOU WILL NEED TO TOGGLE THIS TO 'NO' IF YOU DO NOT WANT IT IN YOUR FAXED NOTE.

## 2022-12-21 ENCOUNTER — APPOINTMENT (RX ONLY)
Dept: URBAN - METROPOLITAN AREA CLINIC 167 | Facility: CLINIC | Age: 77
Setting detail: DERMATOLOGY
End: 2022-12-21

## 2022-12-21 DIAGNOSIS — L57.0 ACTINIC KERATOSIS: ICD-10-CM

## 2022-12-21 PROCEDURE — ? TREATMENT REGIMEN

## 2022-12-21 PROCEDURE — 17000 DESTRUCT PREMALG LESION: CPT

## 2022-12-21 PROCEDURE — ? CURETTAGE AND DESTRUCTION

## 2022-12-21 PROCEDURE — 17003 DESTRUCT PREMALG LES 2-14: CPT

## 2022-12-21 ASSESSMENT — LOCATION SIMPLE DESCRIPTION DERM
LOCATION SIMPLE: RIGHT SCALP
LOCATION SIMPLE: SCALP

## 2022-12-21 ASSESSMENT — LOCATION ZONE DERM: LOCATION ZONE: SCALP

## 2022-12-21 ASSESSMENT — LOCATION DETAILED DESCRIPTION DERM
LOCATION DETAILED: LEFT SUPERIOR PARIETAL SCALP
LOCATION DETAILED: RIGHT SUPERIOR PARIETAL SCALP
LOCATION DETAILED: RIGHT MEDIAL FRONTAL SCALP

## 2022-12-21 NOTE — PROCEDURE: CURETTAGE AND DESTRUCTION
Detail Level: Simple
Size Of Lesion In Cm: 0.5
Size Of Lesion After Curettage: 0.7
Anesthesia Type: 1% lidocaine with epinephrine
Anesthesia Volume In Cc: 0.1
Cautery Type: electrodesiccation
Number Of Curettages: 1
What Was Performed First?: Curettage
Additional Information: (Optional): The wound was cleaned, and a pressure dressing was applied.  The patient received detailed post-op instructions.
Bill For Surgical Tray: no
Consent was obtained from the patient. The risks, benefits and alternatives to therapy were discussed in detail. Specifically, the risks of infection, scarring, bleeding, prolonged wound healing, nerve injury, incomplete removal, allergy to anesthesia and recurrence were addressed. Alternatives to ED&C, such as: surgical removal and XRT were also discussed.  Prior to the procedure, the treatment site was clearly identified and confirmed by the patient. All components of Universal Protocol/PAUSE Rule completed.
Post-Care Instructions: I reviewed with the patient in detail post-care instructions. Patient is to keep the area dry for 48 hours, and not to engage in any swimming until the area is healed. Should the patient develop any fevers, chills, bleeding, severe pain patient will contact the office immediately.
Size Of Lesion In Cm: 0.4
Size Of Lesion After Curettage: 0.6
Additional Information: (Optional): The wound was cleaned, and a pressure dressing was applied.  The patient received detailed post-op instructions.
Consent was obtained from the patient. The risks, benefits and alternatives to therapy were discussed in detail. Specifically, the risks of infection, scarring, bleeding, prolonged wound healing, nerve injury, incomplete removal, allergy to anesthesia and recurrence were addressed. Alternatives to ED&C, such as: surgical removal and XRT were also discussed.  Prior to the procedure, the treatment site was clearly identified and confirmed by the patient. All components of Universal Protocol/PAUSE Rule completed.
Size Of Lesion After Curettage: 0.8

## 2023-01-01 NOTE — PROCEDURE: COUNSELING
Detail Level: Simple
Detail Level: Generalized
Please make an appointment to follow up with your pediatrician for 1-2 days after discharge.

## 2023-01-11 ENCOUNTER — APPOINTMENT (RX ONLY)
Dept: URBAN - METROPOLITAN AREA CLINIC 167 | Facility: CLINIC | Age: 78
Setting detail: DERMATOLOGY
End: 2023-01-11

## 2023-01-11 DIAGNOSIS — L57.0 ACTINIC KERATOSIS: ICD-10-CM | Status: IMPROVED

## 2023-01-11 PROCEDURE — 99213 OFFICE O/P EST LOW 20 MIN: CPT

## 2023-01-11 PROCEDURE — ? COUNSELING

## 2023-01-11 PROCEDURE — ? TREATMENT REGIMEN

## 2023-01-11 PROCEDURE — ? PRESCRIPTION

## 2023-01-11 RX ORDER — IMIQUIMOD 12.5 MG/.25G
CREAM TOPICAL
Qty: 6 | Refills: 0 | Status: ERX | COMMUNITY
Start: 2023-01-11

## 2023-01-11 RX ADMIN — IMIQUIMOD: 12.5 CREAM TOPICAL at 00:00

## 2023-01-11 ASSESSMENT — LOCATION SIMPLE DESCRIPTION DERM
LOCATION SIMPLE: RIGHT SCALP
LOCATION SIMPLE: SCALP

## 2023-01-11 ASSESSMENT — LOCATION DETAILED DESCRIPTION DERM
LOCATION DETAILED: RIGHT MEDIAL FRONTAL SCALP
LOCATION DETAILED: LEFT SUPERIOR PARIETAL SCALP
LOCATION DETAILED: RIGHT SUPERIOR PARIETAL SCALP

## 2023-01-11 ASSESSMENT — LOCATION ZONE DERM: LOCATION ZONE: SCALP

## 2023-02-20 ENCOUNTER — APPOINTMENT (RX ONLY)
Dept: URBAN - METROPOLITAN AREA CLINIC 167 | Facility: CLINIC | Age: 78
Setting detail: DERMATOLOGY
End: 2023-02-20

## 2023-02-20 DIAGNOSIS — L57.0 ACTINIC KERATOSIS: ICD-10-CM

## 2023-02-20 PROCEDURE — ? COUNSELING

## 2023-02-20 PROCEDURE — ? CURETTAGE AND DESTRUCTION

## 2023-02-20 PROCEDURE — 17000 DESTRUCT PREMALG LESION: CPT

## 2023-02-20 PROCEDURE — 17003 DESTRUCT PREMALG LES 2-14: CPT

## 2023-02-20 ASSESSMENT — LOCATION SIMPLE DESCRIPTION DERM: LOCATION SIMPLE: SCALP

## 2023-02-20 ASSESSMENT — LOCATION DETAILED DESCRIPTION DERM
LOCATION DETAILED: LEFT SUPERIOR PARIETAL SCALP
LOCATION DETAILED: RIGHT SUPERIOR PARIETAL SCALP

## 2023-02-20 ASSESSMENT — LOCATION ZONE DERM: LOCATION ZONE: SCALP

## 2023-02-20 NOTE — PROCEDURE: CURETTAGE AND DESTRUCTION
Detail Level: Simple
Size Of Lesion In Cm: 0.4
Size Of Lesion After Curettage: 0.5
Anesthesia Type: 0.5% lidocaine without epinephrine
Anesthesia Volume In Cc: 0.1
Cautery Type: electrodesiccation
Number Of Curettages: 1
What Was Performed First?: Curettage
Additional Information: (Optional): The wound was cleaned, and a pressure dressing was applied.  The patient received detailed post-op instructions.
Bill For Surgical Tray: no
Consent was obtained from the patient. The risks, benefits and alternatives to therapy were discussed in detail. Specifically, the risks of infection, scarring, bleeding, prolonged wound healing, nerve injury, incomplete removal, allergy to anesthesia and recurrence were addressed. Alternatives to ED&C, such as: surgical removal and XRT were also discussed.  Prior to the procedure, the treatment site was clearly identified and confirmed by the patient. All components of Universal Protocol/PAUSE Rule completed.
Post-Care Instructions: I reviewed with the patient in detail post-care instructions. Patient is to keep the area dry for 48 hours, and not to engage in any swimming until the area is healed. Should the patient develop any fevers, chills, bleeding, severe pain patient will contact the office immediately.
Additional Information: (Optional): The wound was cleaned, and a pressure dressing was applied.  The patient received detailed post-op instructions.
Consent was obtained from the patient. The risks, benefits and alternatives to therapy were discussed in detail. Specifically, the risks of infection, scarring, bleeding, prolonged wound healing, nerve injury, incomplete removal, allergy to anesthesia and recurrence were addressed. Alternatives to ED&C, such as: surgical removal and XRT were also discussed.  Prior to the procedure, the treatment site was clearly identified and confirmed by the patient. All components of Universal Protocol/PAUSE Rule completed.

## 2023-06-14 ENCOUNTER — APPOINTMENT (RX ONLY)
Dept: URBAN - METROPOLITAN AREA CLINIC 167 | Facility: CLINIC | Age: 78
Setting detail: DERMATOLOGY
End: 2023-06-14

## 2023-06-14 DIAGNOSIS — L57.0 ACTINIC KERATOSIS: ICD-10-CM

## 2023-06-14 DIAGNOSIS — D22 MELANOCYTIC NEVI: ICD-10-CM

## 2023-06-14 DIAGNOSIS — Z85.828 PERSONAL HISTORY OF OTHER MALIGNANT NEOPLASM OF SKIN: ICD-10-CM

## 2023-06-14 DIAGNOSIS — Z71.89 OTHER SPECIFIED COUNSELING: ICD-10-CM

## 2023-06-14 DIAGNOSIS — L82.1 OTHER SEBORRHEIC KERATOSIS: ICD-10-CM

## 2023-06-14 PROBLEM — D48.5 NEOPLASM OF UNCERTAIN BEHAVIOR OF SKIN: Status: ACTIVE | Noted: 2023-06-14

## 2023-06-14 PROBLEM — D22.5 MELANOCYTIC NEVI OF TRUNK: Status: ACTIVE | Noted: 2023-06-14

## 2023-06-14 PROCEDURE — ? COUNSELING

## 2023-06-14 PROCEDURE — 17003 DESTRUCT PREMALG LES 2-14: CPT

## 2023-06-14 PROCEDURE — 17000 DESTRUCT PREMALG LESION: CPT | Mod: 59

## 2023-06-14 PROCEDURE — 17261 DSTRJ MAL LES T/A/L .6-1.0CM: CPT | Mod: 76

## 2023-06-14 PROCEDURE — ? CURETTAGE AND DESTRUCTION

## 2023-06-14 PROCEDURE — ? BIOPSY BY SHAVE METHOD AND DESTRUCTION

## 2023-06-14 PROCEDURE — 17261 DSTRJ MAL LES T/A/L .6-1.0CM: CPT

## 2023-06-14 PROCEDURE — ? LIQUID NITROGEN

## 2023-06-14 PROCEDURE — 99213 OFFICE O/P EST LOW 20 MIN: CPT | Mod: 25

## 2023-06-14 ASSESSMENT — LOCATION DETAILED DESCRIPTION DERM
LOCATION DETAILED: LEFT INFERIOR CENTRAL MALAR CHEEK
LOCATION DETAILED: LEFT SUPERIOR LATERAL MALAR CHEEK
LOCATION DETAILED: LEFT SUPERIOR CENTRAL MALAR CHEEK
LOCATION DETAILED: LEFT CENTRAL TEMPLE
LOCATION DETAILED: NASAL TIP
LOCATION DETAILED: RIGHT SUPERIOR MEDIAL UPPER BACK
LOCATION DETAILED: POSTERIOR MID-PARIETAL SCALP
LOCATION DETAILED: STERNAL NOTCH

## 2023-06-14 ASSESSMENT — LOCATION SIMPLE DESCRIPTION DERM
LOCATION SIMPLE: CHEST
LOCATION SIMPLE: LEFT TEMPLE
LOCATION SIMPLE: POSTERIOR SCALP
LOCATION SIMPLE: LEFT CHEEK
LOCATION SIMPLE: NOSE
LOCATION SIMPLE: RIGHT UPPER BACK

## 2023-06-14 ASSESSMENT — LOCATION ZONE DERM
LOCATION ZONE: NOSE
LOCATION ZONE: SCALP
LOCATION ZONE: FACE
LOCATION ZONE: TRUNK

## 2023-06-14 NOTE — PROCEDURE: LIQUID NITROGEN
Show Aperture Variable?: Yes
Post-Care Instructions: I reviewed with the patient in detail post-care instructions. Patient is to wear sunprotection, and avoid picking at any of the treated lesions. Pt may apply Vaseline to crusted or scabbing areas.
Render Post-Care Instructions In Note?: no
Duration Of Freeze Thaw-Cycle (Seconds): 10
Detail Level: Detailed
Consent: The patient's consent was obtained including but not limited to risks of crusting, scabbing, blistering, scarring, darker or lighter pigmentary change, recurrence, incomplete removal and infection.
Number Of Freeze-Thaw Cycles: 2 freeze-thaw cycles

## 2023-06-14 NOTE — PROCEDURE: BIOPSY BY SHAVE METHOD AND DESTRUCTION
Detail Level: Detailed
Biopsy Type: H and E
Bill As?: Malignant Destruction
Size Of Lesion In Cm (Optional): 0.7
Size Of Lesion After Curettage: 0.9
Anesthesia Type: 0.5% lidocaine without epinephrine
Anesthesia Volume In Cc: 0.5
Hemostasis: Aluminum Chloride
Destruction Type: electrodesiccation
Number Of Curettages: 2
Wound Care: Aquaphor
Lab: [id number]
Lab Facility: [id number]
Render Path Notes In Note?: No
Consent: Written consent was obtained and risks were reviewed including but not limited to scarring, infection, bleeding, scabbing, incomplete removal, nerve damage and allergy to anesthesia.
Post-Care Instructions: I reviewed with the patient in detail post-care instructions. Patient is to keep the biopsy site dry overnight, and then apply bacitracin twice daily until healed. Patient may apply hydrogen peroxide soaks to remove any crusting.
Notification Instructions: Patient will be notified of biopsy results. However, patient instructed to call the office if not contacted within 2 weeks.
Billing Type: Third-Party Bill
Lab: [id number]
Lab Facility: [id number]
Billing Type: Third-Party Bill

## 2023-06-14 NOTE — PROCEDURE: CURETTAGE AND DESTRUCTION
Detail Level: Simple
Size Of Lesion In Cm: 0.9
Size Of Lesion After Curettage: 1.2
Anesthesia Type: 0.5% lidocaine without epinephrine
Anesthesia Volume In Cc: 0.1
Cautery Type: electrodesiccation
Number Of Curettages: 2
What Was Performed First?: Curettage
Additional Information: (Optional): The wound was cleaned, and a pressure dressing was applied.  The patient received detailed post-op instructions.
Bill For Surgical Tray: no
Consent was obtained from the patient. The risks, benefits and alternatives to therapy were discussed in detail. Specifically, the risks of infection, scarring, bleeding, prolonged wound healing, nerve injury, incomplete removal, allergy to anesthesia and recurrence were addressed. Alternatives to ED&C, such as: surgical removal and XRT were also discussed.  Prior to the procedure, the treatment site was clearly identified and confirmed by the patient. All components of Universal Protocol/PAUSE Rule completed.
Post-Care Instructions: I reviewed with the patient in detail post-care instructions. Patient is to keep the area dry for 48 hours, and not to engage in any swimming until the area is healed. Should the patient develop any fevers, chills, bleeding, severe pain patient will contact the office immediately.

## 2024-01-03 ENCOUNTER — APPOINTMENT (RX ONLY)
Dept: URBAN - METROPOLITAN AREA CLINIC 167 | Facility: CLINIC | Age: 79
Setting detail: DERMATOLOGY
End: 2024-01-03

## 2024-01-03 DIAGNOSIS — Z85.828 PERSONAL HISTORY OF OTHER MALIGNANT NEOPLASM OF SKIN: ICD-10-CM

## 2024-01-03 DIAGNOSIS — D22 MELANOCYTIC NEVI: ICD-10-CM

## 2024-01-03 DIAGNOSIS — L57.0 ACTINIC KERATOSIS: ICD-10-CM

## 2024-01-03 DIAGNOSIS — L82.1 OTHER SEBORRHEIC KERATOSIS: ICD-10-CM

## 2024-01-03 DIAGNOSIS — Z71.89 OTHER SPECIFIED COUNSELING: ICD-10-CM

## 2024-01-03 PROBLEM — D22.5 MELANOCYTIC NEVI OF TRUNK: Status: ACTIVE | Noted: 2024-01-03

## 2024-01-03 PROCEDURE — 17000 DESTRUCT PREMALG LESION: CPT

## 2024-01-03 PROCEDURE — ? LIQUID NITROGEN

## 2024-01-03 PROCEDURE — ? COUNSELING

## 2024-01-03 PROCEDURE — 99213 OFFICE O/P EST LOW 20 MIN: CPT | Mod: 25

## 2024-01-03 ASSESSMENT — LOCATION SIMPLE DESCRIPTION DERM
LOCATION SIMPLE: SCALP
LOCATION SIMPLE: CHEST
LOCATION SIMPLE: LEFT CHEEK
LOCATION SIMPLE: RIGHT EAR
LOCATION SIMPLE: NECK
LOCATION SIMPLE: RIGHT CALF
LOCATION SIMPLE: LEFT PRETIBIAL REGION
LOCATION SIMPLE: RIGHT PRETIBIAL REGION
LOCATION SIMPLE: RIGHT UPPER BACK

## 2024-01-03 ASSESSMENT — LOCATION ZONE DERM
LOCATION ZONE: EAR
LOCATION ZONE: NECK
LOCATION ZONE: LEG
LOCATION ZONE: TRUNK
LOCATION ZONE: FACE
LOCATION ZONE: SCALP

## 2024-01-03 ASSESSMENT — LOCATION DETAILED DESCRIPTION DERM
LOCATION DETAILED: RIGHT PROXIMAL PRETIBIAL REGION
LOCATION DETAILED: LEFT CENTRAL LATERAL NECK
LOCATION DETAILED: RIGHT DISTAL CALF
LOCATION DETAILED: LEFT INFERIOR CENTRAL MALAR CHEEK
LOCATION DETAILED: LEFT DISTAL PRETIBIAL REGION
LOCATION DETAILED: RIGHT ANTERIOR EARLOBE
LOCATION DETAILED: LEFT PROXIMAL PRETIBIAL REGION
LOCATION DETAILED: LEFT LATERAL BUCCAL CHEEK
LOCATION DETAILED: RIGHT DISTAL PRETIBIAL REGION
LOCATION DETAILED: LEFT SUPERIOR PARIETAL SCALP
LOCATION DETAILED: STERNAL NOTCH
LOCATION DETAILED: RIGHT SUPERIOR MEDIAL UPPER BACK

## 2024-01-03 NOTE — PROCEDURE: LIQUID NITROGEN
Render Note In Bullet Format When Appropriate: No
Number Of Freeze-Thaw Cycles: 2 freeze-thaw cycles
Duration Of Freeze Thaw-Cycle (Seconds): 10
Show Aperture Variable?: Yes
Post-Care Instructions: I reviewed with the patient in detail post-care instructions. Patient is to wear sunprotection, and avoid picking at any of the treated lesions. Pt may apply Vaseline to crusted or scabbing areas.
Consent: The patient's consent was obtained including but not limited to risks of crusting, scabbing, blistering, scarring, darker or lighter pigmentary change, recurrence, incomplete removal and infection.
Detail Level: Simple

## 2024-07-10 ENCOUNTER — APPOINTMENT (RX ONLY)
Dept: URBAN - METROPOLITAN AREA CLINIC 167 | Facility: CLINIC | Age: 79
Setting detail: DERMATOLOGY
End: 2024-07-10

## 2024-07-10 DIAGNOSIS — D22 MELANOCYTIC NEVI: ICD-10-CM

## 2024-07-10 DIAGNOSIS — L57.0 ACTINIC KERATOSIS: ICD-10-CM

## 2024-07-10 DIAGNOSIS — Z71.89 OTHER SPECIFIED COUNSELING: ICD-10-CM

## 2024-07-10 DIAGNOSIS — Z85.828 PERSONAL HISTORY OF OTHER MALIGNANT NEOPLASM OF SKIN: ICD-10-CM

## 2024-07-10 DIAGNOSIS — L82.1 OTHER SEBORRHEIC KERATOSIS: ICD-10-CM

## 2024-07-10 PROBLEM — D22.5 MELANOCYTIC NEVI OF TRUNK: Status: ACTIVE | Noted: 2024-07-10

## 2024-07-10 PROBLEM — D48.5 NEOPLASM OF UNCERTAIN BEHAVIOR OF SKIN: Status: ACTIVE | Noted: 2024-07-10

## 2024-07-10 PROCEDURE — ? PRESCRIPTION

## 2024-07-10 PROCEDURE — ? COUNSELING

## 2024-07-10 PROCEDURE — 17261 DSTRJ MAL LES T/A/L .6-1.0CM: CPT

## 2024-07-10 PROCEDURE — 99213 OFFICE O/P EST LOW 20 MIN: CPT | Mod: 25

## 2024-07-10 PROCEDURE — 17280 DSTR MAL LS F/E/E/N/L/M .5/<: CPT

## 2024-07-10 PROCEDURE — 17272 DSTR MAL LES S/N/H/F/G 1.1-2: CPT

## 2024-07-10 PROCEDURE — ? TREATMENT REGIMEN

## 2024-07-10 PROCEDURE — ? BIOPSY BY SHAVE METHOD AND DESTRUCTION

## 2024-07-10 PROCEDURE — 17262 DSTRJ MAL LES T/A/L 1.1-2.0: CPT

## 2024-07-10 PROCEDURE — ? LIQUID NITROGEN

## 2024-07-10 PROCEDURE — 17000 DESTRUCT PREMALG LESION: CPT | Mod: 59

## 2024-07-10 RX ORDER — IMIQUIMOD 12.5 MG/.25G
CREAM TOPICAL
Qty: 12 | Refills: 1 | Status: ERX | COMMUNITY
Start: 2024-07-10

## 2024-07-10 RX ADMIN — IMIQUIMOD: 12.5 CREAM TOPICAL at 00:00

## 2024-07-10 ASSESSMENT — LOCATION ZONE DERM
LOCATION ZONE: LEG
LOCATION ZONE: NOSE
LOCATION ZONE: EAR
LOCATION ZONE: TRUNK
LOCATION ZONE: FACE
LOCATION ZONE: NECK

## 2024-07-10 ASSESSMENT — LOCATION DETAILED DESCRIPTION DERM
LOCATION DETAILED: LEFT CENTRAL LATERAL NECK
LOCATION DETAILED: NASAL SUPRATIP
LOCATION DETAILED: LEFT LATERAL BUCCAL CHEEK
LOCATION DETAILED: LEFT DISTAL PRETIBIAL REGION
LOCATION DETAILED: LEFT INFERIOR CENTRAL MALAR CHEEK
LOCATION DETAILED: RIGHT PROXIMAL PRETIBIAL REGION
LOCATION DETAILED: RIGHT DISTAL CALF
LOCATION DETAILED: STERNAL NOTCH
LOCATION DETAILED: INFERIOR THORACIC SPINE
LOCATION DETAILED: RIGHT DISTAL PRETIBIAL REGION
LOCATION DETAILED: LEFT PROXIMAL PRETIBIAL REGION
LOCATION DETAILED: RIGHT ANTERIOR EARLOBE

## 2024-07-10 ASSESSMENT — LOCATION SIMPLE DESCRIPTION DERM
LOCATION SIMPLE: CHEST
LOCATION SIMPLE: LEFT CHEEK
LOCATION SIMPLE: UPPER BACK
LOCATION SIMPLE: NOSE
LOCATION SIMPLE: RIGHT CALF
LOCATION SIMPLE: NECK
LOCATION SIMPLE: RIGHT PRETIBIAL REGION
LOCATION SIMPLE: LEFT PRETIBIAL REGION
LOCATION SIMPLE: RIGHT EAR

## 2024-07-10 NOTE — PROCEDURE: TREATMENT REGIMEN
Detail Level: Simple
Initiate Treatment: imiquimod 5 % topical cream packet : Apply sparingly to lesions on right ear and left neck once daily, Monday-Friday for 4 weeks after biopsy sites heal. Store in plastic bag

## 2024-07-10 NOTE — PROCEDURE: LIQUID NITROGEN
Render Post-Care Instructions In Note?: no
Duration Of Freeze Thaw-Cycle (Seconds): 10
Show Aperture Variable?: Yes
Consent: The patient's consent was obtained including but not limited to risks of crusting, scabbing, blistering, scarring, darker or lighter pigmentary change, recurrence, incomplete removal and infection.
Number Of Freeze-Thaw Cycles: 2 freeze-thaw cycles
Detail Level: Detailed
Post-Care Instructions: I reviewed with the patient in detail post-care instructions. Patient is to wear sunprotection, and avoid picking at any of the treated lesions. Pt may apply Vaseline to crusted or scabbing areas.

## 2024-07-10 NOTE — PROCEDURE: BIOPSY BY SHAVE METHOD AND DESTRUCTION
Detail Level: Detailed
Biopsy Type: H and E
Bill As?: Malignant Destruction
Size Of Lesion In Cm (Optional): 0.3
Size Of Lesion After Curettage: 0.5
Anesthesia Type: 0.5% lidocaine without epinephrine
Hemostasis: Aluminum Chloride
Destruction Type: electrodesiccation
Number Of Curettages: 2
Wound Care: Aquaphor
Lab: [id number]
Lab Facility: [id number]
Render Path Notes In Note?: No
Consent: Written consent was obtained and risks were reviewed including but not limited to scarring, infection, bleeding, scabbing, incomplete removal, nerve damage and allergy to anesthesia.
Post-Care Instructions: I reviewed with the patient in detail post-care instructions. Patient is to keep the biopsy site dry overnight, and then apply bacitracin twice daily until healed. Patient may apply hydrogen peroxide soaks to remove any crusting.
Notification Instructions: Patient will be notified of biopsy results. However, patient instructed to call the office if not contacted within 2 weeks.
Billing Type: Third-Party Bill
Size Of Lesion In Cm (Optional): 1
Size Of Lesion After Curettage: 1.2
Size Of Lesion In Cm (Optional): 1.4
Size Of Lesion After Curettage: 1.6
Size Of Lesion After Curettage: 0.7

## 2024-07-10 NOTE — PROCEDURE: COUNSELING
Detail Level: Generalized
Detail Level: Simple
Nicotinamide Supplementation Recommendations: 500 mg twice a day
Patient Specific Counseling (Will Not Stick From Patient To Patient): ***after healed treat site with Imiquimod as above for ear
Detail Level: Detailed

## 2024-12-26 NOTE — PROCEDURE: TREATMENT REGIMEN
Detail Level: Simple
Plan: If well healed at followup in a few weeks,  plan to treat area with Imiquimod for 2-4 weeks.
Negative

## 2025-01-13 ENCOUNTER — APPOINTMENT (OUTPATIENT)
Dept: URBAN - METROPOLITAN AREA CLINIC 167 | Facility: CLINIC | Age: 80
Setting detail: DERMATOLOGY
End: 2025-01-13

## 2025-01-13 DIAGNOSIS — D22 MELANOCYTIC NEVI: ICD-10-CM

## 2025-01-13 DIAGNOSIS — L30.0 NUMMULAR DERMATITIS: ICD-10-CM

## 2025-01-13 DIAGNOSIS — L57.0 ACTINIC KERATOSIS: ICD-10-CM

## 2025-01-13 DIAGNOSIS — Z71.89 OTHER SPECIFIED COUNSELING: ICD-10-CM

## 2025-01-13 DIAGNOSIS — Z85.828 PERSONAL HISTORY OF OTHER MALIGNANT NEOPLASM OF SKIN: ICD-10-CM

## 2025-01-13 DIAGNOSIS — L82.1 OTHER SEBORRHEIC KERATOSIS: ICD-10-CM

## 2025-01-13 PROBLEM — D22.5 MELANOCYTIC NEVI OF TRUNK: Status: ACTIVE | Noted: 2025-01-13

## 2025-01-13 PROBLEM — L30.9 DERMATITIS, UNSPECIFIED: Status: ACTIVE | Noted: 2025-01-13

## 2025-01-13 PROBLEM — D48.5 NEOPLASM OF UNCERTAIN BEHAVIOR OF SKIN: Status: ACTIVE | Noted: 2025-01-13

## 2025-01-13 PROCEDURE — 17003 DESTRUCT PREMALG LES 2-14: CPT

## 2025-01-13 PROCEDURE — ? TREATMENT REGIMEN

## 2025-01-13 PROCEDURE — 99213 OFFICE O/P EST LOW 20 MIN: CPT | Mod: 25

## 2025-01-13 PROCEDURE — ? BIOPSY BY SHAVE METHOD AND DESTRUCTION

## 2025-01-13 PROCEDURE — ? LIQUID NITROGEN

## 2025-01-13 PROCEDURE — ? PRESCRIPTION

## 2025-01-13 PROCEDURE — 11102 TANGNTL BX SKIN SINGLE LES: CPT

## 2025-01-13 PROCEDURE — 17000 DESTRUCT PREMALG LESION: CPT | Mod: 59

## 2025-01-13 PROCEDURE — ? COUNSELING

## 2025-01-13 RX ORDER — HYDROCORTISONE 25 MG/G
CREAM TOPICAL
Qty: 30 | Refills: 0 | Status: ERX | COMMUNITY
Start: 2025-01-13

## 2025-01-13 RX ADMIN — HYDROCORTISONE: 25 CREAM TOPICAL at 00:00

## 2025-01-13 ASSESSMENT — LOCATION ZONE DERM
LOCATION ZONE: ARM
LOCATION ZONE: TRUNK
LOCATION ZONE: FACE
LOCATION ZONE: SCALP
LOCATION ZONE: EAR
LOCATION ZONE: LEG
LOCATION ZONE: NECK

## 2025-01-13 ASSESSMENT — LOCATION DETAILED DESCRIPTION DERM
LOCATION DETAILED: LEFT SUPERIOR PARIETAL SCALP
LOCATION DETAILED: LEFT PROXIMAL LATERAL PRETIBIAL REGION
LOCATION DETAILED: LEFT INFERIOR CENTRAL MALAR CHEEK
LOCATION DETAILED: LEFT PROXIMAL PRETIBIAL REGION
LOCATION DETAILED: INFERIOR THORACIC SPINE
LOCATION DETAILED: RIGHT LATERAL BUCCAL CHEEK
LOCATION DETAILED: LEFT CENTRAL LATERAL NECK
LOCATION DETAILED: RIGHT DISTAL PRETIBIAL REGION
LOCATION DETAILED: RIGHT INFERIOR CRUS OF ANTIHELIX
LOCATION DETAILED: LEFT SUPERIOR PREAURICULAR CHEEK
LOCATION DETAILED: RIGHT DORSAL WRIST
LOCATION DETAILED: RIGHT DISTAL CALF
LOCATION DETAILED: RIGHT PROXIMAL PRETIBIAL REGION
LOCATION DETAILED: STERNAL NOTCH
LOCATION DETAILED: LEFT LATERAL BUCCAL CHEEK
LOCATION DETAILED: RIGHT ANTERIOR EARLOBE
LOCATION DETAILED: LEFT DISTAL PRETIBIAL REGION
LOCATION DETAILED: LEFT SUPERIOR LATERAL MALAR CHEEK

## 2025-01-13 ASSESSMENT — LOCATION SIMPLE DESCRIPTION DERM
LOCATION SIMPLE: LEFT PRETIBIAL REGION
LOCATION SIMPLE: LEFT LOWER LEG
LOCATION SIMPLE: RIGHT EAR
LOCATION SIMPLE: SCALP
LOCATION SIMPLE: RIGHT CHEEK
LOCATION SIMPLE: RIGHT PRETIBIAL REGION
LOCATION SIMPLE: RIGHT WRIST
LOCATION SIMPLE: LEFT CHEEK
LOCATION SIMPLE: NECK
LOCATION SIMPLE: CHEST
LOCATION SIMPLE: UPPER BACK
LOCATION SIMPLE: RIGHT CALF

## 2025-01-13 NOTE — PROCEDURE: BIOPSY BY SHAVE METHOD AND DESTRUCTION
Detail Level: Detailed
Biopsy Type: H and E
Bill As?: Biopsy by Shave Method
Size Of Lesion In Cm (Optional): 0.4
Size Of Lesion After Curettage: 0.6
Anesthesia Type: 0.5% lidocaine without epinephrine
Anesthesia Volume In Cc: 0.5
Hemostasis: Drysol
Destruction Type: electrodesiccation
Number Of Curettages: 3
Wound Care: Petrolatum
Lab: [id number]
Lab Facility: [id number]
Render Path Notes In Note?: No
Consent: Written consent was obtained and risks were reviewed including but not limited to scarring, infection, bleeding, scabbing, incomplete removal, nerve damage and allergy to anesthesia.
Post-Care Instructions: I reviewed with the patient in detail post-care instructions. Patient is to keep the biopsy site dry overnight, and then apply bacitracin twice daily until healed. Patient may apply hydrogen peroxide soaks to remove any crusting.
Notification Instructions: Patient will be notified of biopsy results. However, patient instructed to call the office if not contacted within 2 weeks.
Billing Type: Third-Party Bill

## 2025-01-13 NOTE — HPI: NON-MELANOMA SKIN CANCER F/U (HISTORY OF NMSC)
How Many Skin Cancers Have You Had?: more than one
What Is The Reason For Today's Visit?: History of Non-Melanoma Skin Cancer
When Was Your Last Cancer Diagnosed?: 07/10/2024

## 2025-01-13 NOTE — PROCEDURE: LIQUID NITROGEN
Post-Care Instructions: I reviewed with the patient in detail post-care instructions. Patient is to wear sunprotection, and avoid picking at any of the treated lesions. Pt may apply Vaseline to crusted or scabbing areas.
Render Note In Bullet Format When Appropriate: No
Show Aperture Variable?: Yes
Detail Level: Zone
Number Of Freeze-Thaw Cycles: 1 freeze-thaw cycle
Duration Of Freeze Thaw-Cycle (Seconds): 10
Consent: The patient's consent was obtained including but not limited to risks of crusting, scabbing, blistering, scarring, darker or lighter pigmentary change, recurrence, incomplete removal and infection.

## 2025-01-13 NOTE — PROCEDURE: TREATMENT REGIMEN
Initiate Treatment: hydrocortisone 2.5 % topical cream \\nApply to affected area of the leg twice a day for 1-2 weeks
Detail Level: Zone
Plan: F/u if not resolved. Possible biopsy.

## 2025-07-07 ENCOUNTER — APPOINTMENT (OUTPATIENT)
Dept: URBAN - METROPOLITAN AREA CLINIC 167 | Facility: CLINIC | Age: 80
Setting detail: DERMATOLOGY
End: 2025-07-07

## 2025-07-07 DIAGNOSIS — D22 MELANOCYTIC NEVI: ICD-10-CM

## 2025-07-07 DIAGNOSIS — Z85.828 PERSONAL HISTORY OF OTHER MALIGNANT NEOPLASM OF SKIN: ICD-10-CM

## 2025-07-07 DIAGNOSIS — Z71.89 OTHER SPECIFIED COUNSELING: ICD-10-CM

## 2025-07-07 DIAGNOSIS — L82.1 OTHER SEBORRHEIC KERATOSIS: ICD-10-CM

## 2025-07-07 PROBLEM — D22.5 MELANOCYTIC NEVI OF TRUNK: Status: ACTIVE | Noted: 2025-07-07

## 2025-07-07 PROBLEM — D48.5 NEOPLASM OF UNCERTAIN BEHAVIOR OF SKIN: Status: ACTIVE | Noted: 2025-07-07

## 2025-07-07 PROCEDURE — ? COUNSELING

## 2025-07-07 PROCEDURE — ? BIOPSY BY SHAVE METHOD AND DESTRUCTION

## 2025-07-07 ASSESSMENT — LOCATION SIMPLE DESCRIPTION DERM
LOCATION SIMPLE: RIGHT CALF
LOCATION SIMPLE: UPPER BACK
LOCATION SIMPLE: RIGHT PRETIBIAL REGION
LOCATION SIMPLE: RIGHT EAR
LOCATION SIMPLE: NECK
LOCATION SIMPLE: CHEST
LOCATION SIMPLE: LEFT CHEEK
LOCATION SIMPLE: LEFT PRETIBIAL REGION
LOCATION SIMPLE: LEFT FOREARM

## 2025-07-07 ASSESSMENT — LOCATION DETAILED DESCRIPTION DERM
LOCATION DETAILED: INFERIOR THORACIC SPINE
LOCATION DETAILED: RIGHT PROXIMAL PRETIBIAL REGION
LOCATION DETAILED: LEFT DISTAL PRETIBIAL REGION
LOCATION DETAILED: RIGHT ANTERIOR EARLOBE
LOCATION DETAILED: LEFT PROXIMAL PRETIBIAL REGION
LOCATION DETAILED: STERNAL NOTCH
LOCATION DETAILED: LEFT LATERAL BUCCAL CHEEK
LOCATION DETAILED: LEFT DISTAL DORSAL FOREARM
LOCATION DETAILED: RIGHT INFERIOR CRUS OF ANTIHELIX
LOCATION DETAILED: LEFT CENTRAL LATERAL NECK
LOCATION DETAILED: RIGHT DISTAL CALF
LOCATION DETAILED: LEFT INFERIOR CENTRAL MALAR CHEEK
LOCATION DETAILED: RIGHT DISTAL PRETIBIAL REGION

## 2025-07-07 ASSESSMENT — LOCATION ZONE DERM
LOCATION ZONE: LEG
LOCATION ZONE: ARM
LOCATION ZONE: FACE
LOCATION ZONE: TRUNK
LOCATION ZONE: EAR
LOCATION ZONE: NECK

## 2025-07-07 NOTE — PROCEDURE: BIOPSY BY SHAVE METHOD AND DESTRUCTION
Detail Level: Detailed
Biopsy Type: H and E
Bill As?: Biopsy by Shave Method
Size Of Lesion In Cm (Optional): 1
Size Of Lesion After Curettage: 1.2
Anesthesia Type: 0.5% lidocaine with 1:200,000 epinephrine and a 1:10 solution of 8.4% sodium bicarbonate
Anesthesia Volume In Cc: 0.5
Hemostasis: Aluminum Chloride
Destruction Type: electrodesiccation
Number Of Curettages: 2
Wound Care: Aquaphor
Lab: [id number]
Lab Facility: [id number]
Render Path Notes In Note?: No
Consent: Written consent was obtained and risks were reviewed including but not limited to scarring, infection, bleeding, scabbing, incomplete removal, nerve damage and allergy to anesthesia.
Post-Care Instructions: I reviewed with the patient in detail post-care instructions. Patient is to keep the biopsy site dry overnight, and then apply bacitracin twice daily until healed. Patient may apply hydrogen peroxide soaks to remove any crusting.
Notification Instructions: Patient will be notified of biopsy results. However, patient instructed to call the office if not contacted within 2 weeks.
Billing Type: Third-Party Bill
Bill As?: Malignant Destruction
Size Of Lesion In Cm (Optional): 0.3
Anesthesia Type: 0.5% lidocaine without epinephrine